# Patient Record
Sex: FEMALE | Race: BLACK OR AFRICAN AMERICAN | Employment: STUDENT | URBAN - METROPOLITAN AREA
[De-identification: names, ages, dates, MRNs, and addresses within clinical notes are randomized per-mention and may not be internally consistent; named-entity substitution may affect disease eponyms.]

---

## 2021-02-05 ENCOUNTER — OFFICE VISIT (OUTPATIENT)
Dept: URGENT CARE | Age: 31
End: 2021-02-05
Payer: COMMERCIAL

## 2021-02-05 VITALS — TEMPERATURE: 100.2 F | RESPIRATION RATE: 20 BRPM | HEART RATE: 106 BPM | OXYGEN SATURATION: 99 %

## 2021-02-05 DIAGNOSIS — R50.9 FEVER, UNSPECIFIED FEVER CAUSE: ICD-10-CM

## 2021-02-05 DIAGNOSIS — J02.9 SORE THROAT: Primary | ICD-10-CM

## 2021-02-05 DIAGNOSIS — R09.89 CHEST CONGESTION: ICD-10-CM

## 2021-02-05 LAB
S PYO AG THROAT QL: NEGATIVE
SARS-COV-2 POC: POSITIVE
VALID INTERNAL CONTROL?: YES

## 2021-02-05 PROCEDURE — 87426 SARSCOV CORONAVIRUS AG IA: CPT | Performed by: INTERNAL MEDICINE

## 2021-02-05 PROCEDURE — 99211 OFF/OP EST MAY X REQ PHY/QHP: CPT | Performed by: INTERNAL MEDICINE

## 2021-02-05 PROCEDURE — 87880 STREP A ASSAY W/OPTIC: CPT | Performed by: INTERNAL MEDICINE

## 2021-02-06 NOTE — PROGRESS NOTES
HISTORY OF PRESENT ILLNESS  Raghu Garza is a 35 y.o. female. Patient is seen after recent travel and is now is having HA, chest congestion, and sore throat. Reports that she made have had a fever, but is unsure. Not aware of any exposure to COVID. Denies any CP, SOB, cough, HA or GI concerns   Son recently had tonsillitis. Visit Vitals  Pulse (!) 106   Temp 100.2 °F (37.9 °C)   Resp 20   SpO2 99%     HPI    Review of Systems   Constitutional: Positive for fever. HENT: Positive for congestion and sore throat. Respiratory: Negative for cough and shortness of breath. Cardiovascular: Negative for chest pain. Gastrointestinal: Negative. Neurological: Negative. Physical Exam  Vitals signs and nursing note reviewed. Constitutional:       General: She is not in acute distress. HENT:      Mouth/Throat:      Mouth: Mucous membranes are moist.      Pharynx: Posterior oropharyngeal erythema present. Cardiovascular:      Rate and Rhythm: Normal rate and regular rhythm. Pulmonary:      Effort: Pulmonary effort is normal.      Breath sounds: Normal breath sounds. Abdominal:      General: Bowel sounds are normal.   Skin:     General: Skin is warm. Neurological:      Mental Status: She is alert. ASSESSMENT and PLAN  Diagnoses and all orders for this visit:    1. Sore throat  -     AMB POC RAPID STREP A    2. Fever, unspecified fever cause  -     AMB POC SARS-COV-2    3.  Chest congestion  -     AMB POC SARS-COV-2          lab results and schedule of future lab studies reviewed with patient

## 2022-06-22 ENCOUNTER — OFFICE VISIT (OUTPATIENT)
Dept: URGENT CARE | Age: 32
End: 2022-06-22
Payer: COMMERCIAL

## 2022-06-22 VITALS
DIASTOLIC BLOOD PRESSURE: 64 MMHG | SYSTOLIC BLOOD PRESSURE: 108 MMHG | TEMPERATURE: 98.5 F | RESPIRATION RATE: 16 BRPM | HEART RATE: 72 BPM | WEIGHT: 186.5 LBS | OXYGEN SATURATION: 98 % | BODY MASS INDEX: 30.1 KG/M2

## 2022-06-22 DIAGNOSIS — L98.9 SKIN LESION: Primary | ICD-10-CM

## 2022-06-22 PROCEDURE — S9083 URGENT CARE CENTER GLOBAL: HCPCS | Performed by: FAMILY MEDICINE

## 2022-06-22 RX ORDER — TRIAMCINOLONE ACETONIDE 1 MG/G
CREAM TOPICAL 2 TIMES DAILY
Qty: 30 G | Refills: 0 | Status: SHIPPED | OUTPATIENT
Start: 2022-06-22

## 2022-06-22 NOTE — PATIENT INSTRUCTIONS
Dermatitis: Care Instructions  Overview  Dermatitis is the general name used for any rash or inflammation of the skin. Different kinds of dermatitis cause different kinds of rashes. Common causes of a rash include new medicines, plants (such as poison oak or poison ivy), heat, and stress. Certain illnesses can also cause a rash. An allergic reaction to something that touches your skin, such as latex, nickel, or poison ivy, is called contact dermatitis. Contact dermatitis may also be caused by something that irritates the skin, such as bleach, a chemical, or soap. These types of rashes cannot be spread from person to person. How long your rash will last depends on what caused it. Rashes may last a few days or months. Follow-up care is a key part of your treatment and safety. Be sure to make and go to all appointments, and call your doctor if you are having problems. It's also a good idea to know your test results and keep a list of the medicines you take. How can you care for yourself at home? · Do not scratch the rash. Cut your nails short, and file them smooth. Or wear gloves if this helps keep you from scratching. · Wash the area with water only. Pat dry. · Put cold, wet cloths on the rash to reduce itching. · Keep cool, and stay out of the sun. · Leave the rash open to the air as much as possible. · If the rash itches, use hydrocortisone cream. Follow the directions on the label. Calamine lotion may help for plant rashes. · If itching affects your sleep, ask your doctor if you can take an antihistamine that might reduce itching and make you sleepy, such as diphenhydramine (Benadryl). Be safe with medicines. Read and follow all instructions on the label. · If your doctor prescribed a cream, use it as directed. If your doctor prescribed medicine, take it exactly as directed. When should you call for help?    Call your doctor now or seek immediate medical care if:    · You have symptoms of infection, such as:  ? Increased pain, swelling, warmth, or redness. ? Red streaks leading from the area. ? Pus draining from the area. ? A fever.     · You have joint pain along with the rash. Watch closely for changes in your health, and be sure to contact your doctor if:    · Your rash is changing or getting worse.     · You are not getting better as expected. Where can you learn more? Go to http://www.gray.com/  Enter F270 in the search box to learn more about \"Dermatitis: Care Instructions. \"  Current as of: November 15, 2021               Content Version: 13.2  © 4579-8538 [employer]. Care instructions adapted under license by [employer] (which disclaims liability or warranty for this information). If you have questions about a medical condition or this instruction, always ask your healthcare professional. [address] any warranty or liability for your use of this information.

## 2022-06-26 NOTE — PROGRESS NOTES
Skin Problem  This is a new problem. The current episode started more than 2 days ago (6/19/22). The problem occurs daily. The problem has not changed since onset. Associated symptoms comments: She has 2 area of irritation and redness , which she thinks is insect bite  C/o itching- no drainage   . Nothing aggravates the symptoms. Nothing relieves the symptoms. She has tried nothing for the symptoms. Past Medical History:   Diagnosis Date    HX OTHER MEDICAL     sphriocytosis    Sickle-cell disease, unspecified trait        History reviewed. No pertinent surgical history. Family History   Problem Relation Age of Onset    Sickle Cell Trait Mother     Sickle Cell Trait Sister     Cancer Maternal Grandmother     Sickle Cell Trait Sister         Social History     Socioeconomic History    Marital status: SINGLE     Spouse name: Not on file    Number of children: Not on file    Years of education: Not on file    Highest education level: Not on file   Occupational History    Not on file   Tobacco Use    Smoking status: Never Smoker    Smokeless tobacco: Never Used   Substance and Sexual Activity    Alcohol use: No    Drug use: No    Sexual activity: Yes     Partners: Male   Other Topics Concern    Not on file   Social History Narrative    Not on file     Social Determinants of Health     Financial Resource Strain:     Difficulty of Paying Living Expenses: Not on file   Food Insecurity:     Worried About Running Out of Food in the Last Year: Not on file    Courtney of Food in the Last Year: Not on file   Transportation Needs:     Lack of Transportation (Medical): Not on file    Lack of Transportation (Non-Medical):  Not on file   Physical Activity:     Days of Exercise per Week: Not on file    Minutes of Exercise per Session: Not on file   Stress:     Feeling of Stress : Not on file   Social Connections:     Frequency of Communication with Friends and Family: Not on file    Frequency of Social Gatherings with Friends and Family: Not on file    Attends Restorationism Services: Not on file    Active Member of Clubs or Organizations: Not on file    Attends Club or Organization Meetings: Not on file    Marital Status: Not on file   Intimate Partner Violence:     Fear of Current or Ex-Partner: Not on file    Emotionally Abused: Not on file    Physically Abused: Not on file    Sexually Abused: Not on file   Housing Stability:     Unable to Pay for Housing in the Last Year: Not on file    Number of [place] in the Last Year: Not on file    Unstable Housing in the Last Year: Not on file                ALLERGIES: Patient has no known allergies. Review of Systems   Skin: Positive for color change. 2 lesions    All other systems reviewed and are negative. Vitals:    06/22/22 1843   BP: 108/64   Pulse: 72   Resp: 16   Temp: 98.5 °F (36.9 °C)   SpO2: 98%   Weight: 186 lb 8 oz (84.6 kg)       Physical Exam  Skin:     Findings: Lesion (on rt side of neck and rt abdominal wall- c.w non specific dermatitis) present. MDM    Procedures        ICD-10-CM ICD-9-CM    1. Skin lesion  L98.9 709.9     on Rt neck and Rt abdominal wall ( Nr,Umbilicus)     Medications Ordered Today   Medications    triamcinolone acetonide (KENALOG) 0.1 % topical cream     Sig: Apply  to affected area two (2) times a day. use thin layer     Dispense:  30 g     Refill:  0     No results found for any visits on 06/22/22. The patients condition was discussed with the patient and they understand. The patient is to follow up with primary care doctor. If signs and symptoms become worse the pt is to go to the ER. The patient is to take medications as prescribed.

## 2022-11-30 ENCOUNTER — OFFICE VISIT (OUTPATIENT)
Dept: URGENT CARE | Age: 32
End: 2022-11-30
Payer: COMMERCIAL

## 2022-11-30 VITALS
RESPIRATION RATE: 16 BRPM | DIASTOLIC BLOOD PRESSURE: 62 MMHG | HEART RATE: 98 BPM | SYSTOLIC BLOOD PRESSURE: 93 MMHG | WEIGHT: 191 LBS | OXYGEN SATURATION: 100 % | BODY MASS INDEX: 30.83 KG/M2 | TEMPERATURE: 97.8 F

## 2022-11-30 DIAGNOSIS — J02.9 EXUDATIVE PHARYNGITIS: Primary | ICD-10-CM

## 2022-11-30 LAB
S PYO AG THROAT QL: NEGATIVE
VALID INTERNAL CONTROL?: YES

## 2022-11-30 PROCEDURE — 87880 STREP A ASSAY W/OPTIC: CPT | Performed by: FAMILY MEDICINE

## 2022-11-30 PROCEDURE — S9083 URGENT CARE CENTER GLOBAL: HCPCS | Performed by: FAMILY MEDICINE

## 2022-11-30 RX ORDER — AMOXICILLIN 875 MG/1
875 TABLET, FILM COATED ORAL 2 TIMES DAILY
Qty: 20 TABLET | Refills: 0 | Status: SHIPPED | OUTPATIENT
Start: 2022-11-30 | End: 2022-12-10

## 2022-11-30 RX ORDER — LIDOCAINE HYDROCHLORIDE 20 MG/ML
5 SOLUTION OROPHARYNGEAL
Qty: 100 ML | Refills: 0 | Status: SHIPPED | OUTPATIENT
Start: 2022-11-30

## 2022-11-30 NOTE — PROGRESS NOTES
Jose Perales is a 33 y.o. female who presents with ST, left ear pain x 4 days. Denies cough, fever. Had N/V 5 days ago; now resolved. Took OTC sinus medication last night without relief. The history is provided by the patient. Past Medical History:   Diagnosis Date    HX OTHER MEDICAL     sphriocytosis    Sickle-cell disease, unspecified trait        History reviewed. No pertinent surgical history. Family History   Problem Relation Age of Onset    Sickle Cell Trait Mother     Sickle Cell Trait Sister     Cancer Maternal Grandmother     Sickle Cell Trait Sister         Social History     Socioeconomic History    Marital status: SINGLE     Spouse name: Not on file    Number of children: Not on file    Years of education: Not on file    Highest education level: Not on file   Occupational History    Not on file   Tobacco Use    Smoking status: Never    Smokeless tobacco: Never   Substance and Sexual Activity    Alcohol use: No    Drug use: No    Sexual activity: Yes     Partners: Male   Other Topics Concern    Not on file   Social History Narrative    Not on file     Social Determinants of Health     Financial Resource Strain: Not on file   Food Insecurity: Not on file   Transportation Needs: Not on file   Physical Activity: Not on file   Stress: Not on file   Social Connections: Not on file   Intimate Partner Violence: Not on file   Housing Stability: Not on file                ALLERGIES: Patient has no known allergies. Review of Systems   Constitutional:  Negative for activity change, appetite change and fever. HENT:  Positive for ear pain and sore throat. Respiratory:  Negative for cough and shortness of breath. Gastrointestinal:  Positive for nausea and vomiting. Negative for diarrhea. Vitals:    11/30/22 0843   BP: 93/62   Pulse: 98   Resp: 16   Temp: 97.8 °F (36.6 °C)   SpO2: 100%   Weight: 191 lb (86.6 kg)       Physical Exam  Vitals and nursing note reviewed.    Constitutional: General: She is not in acute distress. Appearance: She is well-developed. She is not diaphoretic. HENT:      Right Ear: Tympanic membrane and ear canal normal.      Left Ear: Tympanic membrane and ear canal normal.      Mouth/Throat:      Mouth: Mucous membranes are moist.      Pharynx: Oropharyngeal exudate and posterior oropharyngeal erythema present. Tonsils: Tonsillar exudate present. No tonsillar abscesses. 1+ on the right. 1+ on the left. Pulmonary:      Effort: Pulmonary effort is normal.   Lymphadenopathy:      Cervical: Cervical adenopathy present. Neurological:      Mental Status: She is alert. Psychiatric:         Behavior: Behavior normal.         Thought Content: Thought content normal.         Judgment: Judgment normal.       MDM    ICD-10-CM ICD-9-CM   1. Exudative pharyngitis  J02.9 462       Orders Placed This Encounter    UPPER RESPIRATORY CULTURE    AMB POC RAPID STREP A    amoxicillin (AMOXIL) 875 mg tablet     Sig: Take 1 Tablet by mouth two (2) times a day for 10 days. Dispense:  20 Tablet     Refill:  0    lidocaine (Lidocaine Viscous) 2 % solution     Sig: Take 5 mL by mouth every three (3) hours as needed for Pain. With a sip of water, gargle for 30-60 seconds then spit     Dispense:  100 mL     Refill:  0      Await throat cx to confirm  The patient is to follow up with PCP or [facility]. If signs and symptoms become worse the pt is to go to the ER.      Results for orders placed or performed in visit on 11/30/22   AMB POC RAPID STREP A   Result Value Ref Range    VALID INTERNAL CONTROL POC Yes     Group A Strep Ag Negative Negative         Procedures